# Patient Record
Sex: FEMALE | Employment: FULL TIME | URBAN - METROPOLITAN AREA
[De-identification: names, ages, dates, MRNs, and addresses within clinical notes are randomized per-mention and may not be internally consistent; named-entity substitution may affect disease eponyms.]

---

## 2017-05-10 ENCOUNTER — HOSPITAL ENCOUNTER (EMERGENCY)
Age: 27
Discharge: HOME | End: 2017-05-10
Attending: EMERGENCY MEDICINE
Payer: SELF-PAY

## 2017-05-10 VITALS
OXYGEN SATURATION: 97 % | WEIGHT: 200 LBS | BODY MASS INDEX: 34.33 KG/M2 | DIASTOLIC BLOOD PRESSURE: 94 MMHG | TEMPERATURE: 98.7 F | RESPIRATION RATE: 16 BRPM | SYSTOLIC BLOOD PRESSURE: 147 MMHG | HEART RATE: 100 BPM

## 2017-05-10 DIAGNOSIS — S29.019A ACUTE THORACIC MYOFASCIAL STRAIN, INITIAL ENCOUNTER: ICD-10-CM
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
## 2017-05-10 PROCEDURE — 99284 EMERGENCY DEPT VISIT MOD MDM: CPT

## 2017-05-10 PROCEDURE — 74011250637 HC RX REV CODE- 250/637: Performed by: EMERGENCY MEDICINE

## 2017-05-10 RX ORDER — DIAZEPAM 5 MG/1
5 TABLET ORAL
Status: COMPLETED | OUTPATIENT
Start: 2017-05-10 | End: 2017-05-10

## 2017-05-10 RX ORDER — IBUPROFEN 600 MG/1
600 TABLET ORAL
Qty: 20 TAB | Refills: 0 | Status: SHIPPED | OUTPATIENT
Start: 2017-05-10 | End: 2018-07-03

## 2017-05-10 RX ORDER — IBUPROFEN 600 MG/1
600 TABLET ORAL
Status: COMPLETED | OUTPATIENT
Start: 2017-05-10 | End: 2017-05-10

## 2017-05-10 RX ORDER — ACETAMINOPHEN 500 MG
1000 TABLET ORAL
Status: COMPLETED | OUTPATIENT
Start: 2017-05-10 | End: 2017-05-10

## 2017-05-10 RX ORDER — ACETAMINOPHEN 500 MG
1000 TABLET ORAL
Qty: 40 TAB | Refills: 0 | Status: SHIPPED | OUTPATIENT
Start: 2017-05-10 | End: 2022-02-04

## 2017-05-10 RX ORDER — CYCLOBENZAPRINE HCL 10 MG
10 TABLET ORAL
Qty: 10 TAB | Refills: 0 | Status: SHIPPED | OUTPATIENT
Start: 2017-05-10 | End: 2018-06-14

## 2017-05-10 RX ADMIN — IBUPROFEN 600 MG: 600 TABLET, FILM COATED ORAL at 22:21

## 2017-05-10 RX ADMIN — DIAZEPAM 5 MG: 5 TABLET ORAL at 22:21

## 2017-05-10 RX ADMIN — ACETAMINOPHEN 1000 MG: 500 TABLET ORAL at 22:21

## 2017-05-10 NOTE — LETTER
NOTIFICATION RETURN TO WORK / SCHOOL 
 
5/10/2017 10:07 PM 
 
Ms. [address] [facility] [address] [facility] [address] To Whom It May Concern: 
 
Whitney Ricci is currently under the care of [facility] EMERGENCY DEPT. She will return to work/school on: 5/13/17 If there are questions or concerns please have the patient contact our office. Sincerely, Delilah Rogers MD
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
## 2018-06-14 ENCOUNTER — HOSPITAL ENCOUNTER (EMERGENCY)
Age: 28
Discharge: HOME | End: 2018-06-14
Attending: EMERGENCY MEDICINE
Payer: MEDICAID

## 2018-06-14 VITALS
WEIGHT: 189 LBS | RESPIRATION RATE: 16 BRPM | HEIGHT: 64 IN | HEART RATE: 88 BPM | TEMPERATURE: 98.1 F | OXYGEN SATURATION: 100 % | SYSTOLIC BLOOD PRESSURE: 135 MMHG | DIASTOLIC BLOOD PRESSURE: 93 MMHG | BODY MASS INDEX: 32.27 KG/M2

## 2018-06-14 DIAGNOSIS — R07.89 ATYPICAL CHEST PAIN: ICD-10-CM

## 2018-06-14 DIAGNOSIS — R00.2 PALPITATIONS: Primary | ICD-10-CM

## 2018-06-14 LAB
ANION GAP SERPL CALC-SCNC: 5 MMOL/L (ref 3–18)
BASOPHILS # BLD: 0 K/UL (ref 0–0.06)
BASOPHILS NFR BLD: 0 % (ref 0–2)
BUN SERPL-MCNC: 16 MG/DL (ref 7–18)
BUN/CREAT SERPL: 18 (ref 12–20)
CALCIUM SERPL-MCNC: 9.1 MG/DL (ref 8.5–10.1)
CHLORIDE SERPL-SCNC: 107 MMOL/L (ref 100–108)
CK MB CFR SERPL CALC: NORMAL % (ref 0–4)
CK MB SERPL-MCNC: <1 NG/ML (ref 5–25)
CK SERPL-CCNC: 84 U/L (ref 26–192)
CO2 SERPL-SCNC: 29 MMOL/L (ref 21–32)
CREAT SERPL-MCNC: 0.91 MG/DL (ref 0.6–1.3)
DIFFERENTIAL METHOD BLD: ABNORMAL
EOSINOPHIL # BLD: 0 K/UL (ref 0–0.4)
EOSINOPHIL NFR BLD: 0 % (ref 0–5)
ERYTHROCYTE [DISTWIDTH] IN BLOOD BY AUTOMATED COUNT: 13.8 % (ref 11.6–14.5)
GLUCOSE SERPL-MCNC: 87 MG/DL (ref 74–99)
HCT VFR BLD AUTO: 35.8 % (ref 35–45)
HGB BLD-MCNC: 11.9 G/DL (ref 12–16)
LYMPHOCYTES # BLD: 2.8 K/UL (ref 0.9–3.6)
LYMPHOCYTES NFR BLD: 44 % (ref 21–52)
MCH RBC QN AUTO: 26.7 PG (ref 24–34)
MCHC RBC AUTO-ENTMCNC: 33.2 G/DL (ref 31–37)
MCV RBC AUTO: 80.3 FL (ref 74–97)
MONOCYTES # BLD: 0.3 K/UL (ref 0.05–1.2)
MONOCYTES NFR BLD: 4 % (ref 3–10)
NEUTS SEG # BLD: 3.2 K/UL (ref 1.8–8)
NEUTS SEG NFR BLD: 52 % (ref 40–73)
PLATELET # BLD AUTO: 162 K/UL (ref 135–420)
PMV BLD AUTO: 11.7 FL (ref 9.2–11.8)
POTASSIUM SERPL-SCNC: 4.2 MMOL/L (ref 3.5–5.5)
RBC # BLD AUTO: 4.46 M/UL (ref 4.2–5.3)
SODIUM SERPL-SCNC: 141 MMOL/L (ref 136–145)
TROPONIN I SERPL-MCNC: <0.02 NG/ML (ref 0–0.04)
WBC # BLD AUTO: 6.4 K/UL (ref 4.6–13.2)

## 2018-06-14 PROCEDURE — 82550 ASSAY OF CK (CPK): CPT | Performed by: PHYSICIAN ASSISTANT

## 2018-06-14 PROCEDURE — 99283 EMERGENCY DEPT VISIT LOW MDM: CPT

## 2018-06-14 PROCEDURE — 85025 COMPLETE CBC W/AUTO DIFF WBC: CPT | Performed by: PHYSICIAN ASSISTANT

## 2018-06-14 PROCEDURE — 93005 ELECTROCARDIOGRAM TRACING: CPT

## 2018-06-14 PROCEDURE — 80048 BASIC METABOLIC PNL TOTAL CA: CPT | Performed by: PHYSICIAN ASSISTANT
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
## 2018-06-15 LAB
ATRIAL RATE: 70 BPM
CALCULATED P AXIS, ECG09: 43 DEGREES
CALCULATED R AXIS, ECG10: 57 DEGREES
CALCULATED T AXIS, ECG11: 45 DEGREES
DIAGNOSIS, 93000: NORMAL
P-R INTERVAL, ECG05: 132 MS
Q-T INTERVAL, ECG07: 408 MS
QRS DURATION, ECG06: 94 MS
QTC CALCULATION (BEZET), ECG08: 440 MS
VENTRICULAR RATE, ECG03: 70 BPM

## 2018-07-03 ENCOUNTER — HOSPITAL ENCOUNTER (EMERGENCY)
Age: 28
Discharge: HOME | End: 2018-07-03
Attending: EMERGENCY MEDICINE
Payer: MEDICAID

## 2018-07-03 ENCOUNTER — APPOINTMENT (OUTPATIENT)
Dept: GENERAL RADIOLOGY | Age: 28
End: 2018-07-03
Attending: PHYSICIAN ASSISTANT
Payer: MEDICAID

## 2018-07-03 VITALS
SYSTOLIC BLOOD PRESSURE: 144 MMHG | RESPIRATION RATE: 15 BRPM | HEART RATE: 75 BPM | OXYGEN SATURATION: 100 % | TEMPERATURE: 98.4 F | DIASTOLIC BLOOD PRESSURE: 73 MMHG | WEIGHT: 189 LBS | BODY MASS INDEX: 32.27 KG/M2 | HEIGHT: 64 IN

## 2018-07-03 DIAGNOSIS — M54.50 ACUTE BILATERAL LOW BACK PAIN WITHOUT SCIATICA: Primary | ICD-10-CM

## 2018-07-03 PROCEDURE — 96361 HYDRATE IV INFUSION ADD-ON: CPT

## 2018-07-03 PROCEDURE — 99282 EMERGENCY DEPT VISIT SF MDM: CPT

## 2018-07-03 PROCEDURE — 96374 THER/PROPH/DIAG INJ IV PUSH: CPT

## 2018-07-03 PROCEDURE — 96375 TX/PRO/DX INJ NEW DRUG ADDON: CPT

## 2018-07-03 PROCEDURE — 72100 X-RAY EXAM L-S SPINE 2/3 VWS: CPT

## 2018-07-03 RX ORDER — CYCLOBENZAPRINE HCL 10 MG
10 TABLET ORAL
Qty: 12 TAB | Refills: 0 | Status: SHIPPED | OUTPATIENT
Start: 2018-07-03 | End: 2022-02-04

## 2018-07-03 RX ORDER — NAPROXEN 500 MG/1
500 TABLET ORAL 2 TIMES DAILY WITH MEALS
Qty: 20 TAB | Refills: 0 | Status: SHIPPED | OUTPATIENT
Start: 2018-07-03 | End: 2018-07-13

## 2018-07-03 NOTE — DISCHARGE INSTRUCTIONS

## 2018-07-03 NOTE — ED NOTES
Patient stated understanding of discharge instructions. Patient received one prescription(s) Patient told not to drive with medication. Patient was ambulatory upon discharge. Patient was in stable condition.      Patient armband removed and shredded
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
## 2020-02-27 ENCOUNTER — DOCUMENTATION ONLY (OUTPATIENT)
Dept: FAMILY MEDICINE CLINIC | Age: 30
End: 2020-02-27

## 2020-02-27 NOTE — LETTER
2/27/2020 [facility] [id number] Dear Ms. [facility] LEILA, We had an appointment reserved for you on 02/26/2020 and were concerned when you did not show or call within 24 hours to cancel the appointment. Our policy is to call patients two days prior to their appointment to remind them of the date and time. We perform these calls as a courtesy to our patients and to allow us the opportunity to rebook the time slot should the appointment not be necessary. Recognizing that everyones time is valuable and that appointment time is limited, we ask that you provide 24 hours notice if you are unable to keep your appointment. Please call us at your earliest convenience to reschedule your appointment as your provider felt it was important to see you. Thank you for your anticipated cooperation. The scheduling staff: 
 
Odilia Brody [address] [facility] 
[phone]

## 2022-02-04 ENCOUNTER — OFFICE VISIT (OUTPATIENT)
Dept: SURGERY | Age: 32
End: 2022-02-04
Payer: MEDICAID

## 2022-02-04 VITALS — WEIGHT: 238 LBS | TEMPERATURE: 97.5 F | BODY MASS INDEX: 39.65 KG/M2 | HEIGHT: 65 IN

## 2022-02-04 DIAGNOSIS — Z01.818 PREOPERATIVE TESTING: ICD-10-CM

## 2022-02-04 DIAGNOSIS — K21.9 GASTROESOPHAGEAL REFLUX DISEASE, UNSPECIFIED WHETHER ESOPHAGITIS PRESENT: ICD-10-CM

## 2022-02-04 DIAGNOSIS — E66.01 CLASS 3 SEVERE OBESITY WITHOUT SERIOUS COMORBIDITY WITH BODY MASS INDEX (BMI) OF 40.0 TO 44.9 IN ADULT, UNSPECIFIED OBESITY TYPE (HCC): ICD-10-CM

## 2022-02-04 DIAGNOSIS — E66.01 CLASS 3 SEVERE OBESITY WITHOUT SERIOUS COMORBIDITY WITH BODY MASS INDEX (BMI) OF 40.0 TO 44.9 IN ADULT, UNSPECIFIED OBESITY TYPE (HCC): Primary | ICD-10-CM

## 2022-02-04 PROCEDURE — 99205 OFFICE O/P NEW HI 60 MIN: CPT | Performed by: SURGERY

## 2022-02-04 NOTE — PROGRESS NOTES
Kurtis Navarro is a 36 y.o. female (: 1990) presenting to address:    Chief Complaint   Patient presents with    New Patient     GBP consult        Medication list and allergies have been reviewed with Kurtis Navarro and updated as of today's date. I have gone over all Medical, Surgical and Social History with Kurtis Navarro and updated/added the information accordingly.
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
## 2022-02-06 LAB
SPECIMEN STATUS REPORT, ROLRST: NORMAL
UREA BREATH TEST QL: POSITIVE

## 2022-02-07 ENCOUNTER — TELEPHONE (OUTPATIENT)
Dept: SURGERY | Age: 32
End: 2022-02-07

## 2022-02-07 RX ORDER — AMOXICILLIN 500 MG/1
1000 CAPSULE ORAL 2 TIMES DAILY
Qty: 30 CAPSULE | Refills: 0 | Status: SHIPPED | OUTPATIENT
Start: 2022-02-07 | End: 2022-02-21

## 2022-02-07 RX ORDER — CLARITHROMYCIN 500 MG/1
500 TABLET, FILM COATED ORAL 2 TIMES DAILY
Qty: 28 TABLET | Refills: 0 | Status: SHIPPED | OUTPATIENT
Start: 2022-02-07 | End: 2022-02-07

## 2022-02-07 RX ORDER — AMOXICILLIN 500 MG/1
1000 CAPSULE ORAL 2 TIMES DAILY
Qty: 30 CAPSULE | Refills: 0 | Status: SHIPPED | OUTPATIENT
Start: 2022-02-07 | End: 2022-02-07

## 2022-02-07 RX ORDER — CLARITHROMYCIN 500 MG/1
500 TABLET, FILM COATED ORAL 2 TIMES DAILY
Qty: 28 TABLET | Refills: 0 | Status: SHIPPED | OUTPATIENT
Start: 2022-02-07 | End: 2022-02-21

## 2022-02-07 RX ORDER — ESOMEPRAZOLE MAGNESIUM 40 MG/1
40 FOR SUSPENSION ORAL DAILY
Qty: 14 PACKET | Refills: 0 | Status: SHIPPED | OUTPATIENT
Start: 2022-02-07 | End: 2022-02-21

## 2022-02-07 RX ORDER — ESOMEPRAZOLE MAGNESIUM 40 MG/1
40 FOR SUSPENSION ORAL DAILY
Qty: 14 PACKET | Refills: 0 | Status: SHIPPED | OUTPATIENT
Start: 2022-02-07 | End: 2022-02-07

## 2022-02-07 NOTE — TELEPHONE ENCOUNTER
Informed patient of positive H. Pylori results and reviewed treatment plan. Patient verbalized understanding.     ----- Message from Claudio Moyer MD sent at 2/7/2022  9:56 AM EST -----  Please notify patient of H pylori positive result and that I sent scripts to her pharmacy on file (mail in pharmacy, looks like).  Thanks!  ----- Message -----  From: Manuela Taylor Lab Results In  Sent: 2/6/2022   6:38 AM EST  To: Claudio Moyer MD

## 2022-02-16 ENCOUNTER — HOSPITAL ENCOUNTER (OUTPATIENT)
Dept: BARIATRICS/WEIGHT MGMT | Age: 32
Discharge: HOME | End: 2022-02-16

## 2022-02-18 ENCOUNTER — DOCUMENTATION ONLY (OUTPATIENT)
Dept: BARIATRICS/WEIGHT MGMT | Age: 32
End: 2022-02-18

## 2022-02-18 NOTE — PROGRESS NOTES
[address] Loss Dena BOWERS [place] [address] [facility], [address]    Patient's Name: Jose Rooney   Age: 36 y.o. YOB: 1990   Sex: female    Date:   2/18/2022              Session: 1 of 6  Revision:     Surgeon:  Suzanne Ibarra    Height: 64.5  inches   Weight:    237      Lbs. BMI: 40.1  Pounds Lost since last month: 1               Pounds Gained since last month: 0    Starting Weight: 238     Previous Months Weight: 238  Overall Pounds Lost: 1   Overall Pounds Gained: 0    Do you smoke? no    Alcohol intake:  Number of drinks at a time:  Occasionally 3 times a month      Class Guidelines    Guidelines are reviewed with patient at the start of every class. 1. Patient understands that weight loss trial classes must be consecutive. Patient understands if they miss a class, it is their responsibility to contact me to reschedule class. I will reach out to patient after their first no show. 2.  Patient understands the expectations that weight maintenance/weight loss is expected during the classes. Failure to demonstrate changes may result in one extra month of weight loss trial, followed by going back to see the surgeon. 3. Patient is also instructed to be doing their labs, blood work, psych visit, support group and any other test that the surgeon has used while they are working on their weight loss trial.   Patient understands that they CAN NOT gain any weight during the weight loss trial.  Gaining weight will result in extra classes    Changes Made Since Last Class: Drinking only water    Eating Habits and Behaviors      Today we started off class talking about the Key Diet Principles. Patient was encouraged to start drinking 64 ounces of fluid per day. Patient was encouraged to start cutting out soda, caffeine, carbonation, sweet tea, fruit juice, and fruit smoothies.  Patient was also instructed to fill up on meat, fish, vegetables, eggs, cheese, and some fruit. We also talked about protein drinks and patient was encouraged to start trying these, using them either for a meal replacement or a substitute for a current meal, which may be higher in carbohydrates. We talked about ways to lower carbohydrates and start trying substitutes, such as zucchini noodles and cauliflower rice. Patient's current diet habits include: Patient is eating 2 meals per day. Patient states meals are normally made up of salad, meat. We have talked about eating protein first, following by vegetables. Patient is encouraged to start eliminating carbohydrates and try to keep less than 75 grams per day. Examples were reviewed. Portion sizes are 3 oz. Suggestions and tips were reviewed to help decrease portion sizes. Eating out frequency is 5 times a month. We talked about the importance of planning ahead, so eating out intake can be decreased. Discussed with patient if they need to eat out, healthier options to choose from. Carbohydrate intake (including bread, rice, pasta, cereal, crackers, chips, etc.) is: pasta, bread, rice. I have talked to patient about the importance of filling up on protein first, which will help with satiety. Patient is snacking  on chips. We talked about snacks that would be more protein-based. Fluid intake is:  Smoothies water and sodas. Patient is encouraged to stick to non-caloric drinks only. Physical Activity/Exercise    Comments:     Currently for exercise, patient is gym twice a week. We talked about activities for patient to do, including walking, swimming, or chair exercises. I also talked with patient about doing some strength training, which helps the metabolism, as well. Goals have been set. Behavior Modification       Comments:   I also gave a power point on Behavior Changes and Weight Loss. Some of the suggestions in the power point included food journaling.   Patient was also given some strategies to follow, such as cooking just enough for the meal and not putting serving bowls on the table. Patient was also encouraged to restrict where they are eating to 1-2 locations to avoid mindless eating throughout the day. Patient was given a check off list and encouraged to set 3 goals for the month. I have really stressed to patient the importance of food journaling. We talked about the accountability that this provides. Patient is currently not doing food journaling.       One goal that patient has set for next month is:  Exercise, discipline, changing eating habits    Mehnaz Us RDN  2/18/2022

## 2022-03-17 ENCOUNTER — TELEPHONE (OUTPATIENT)
Dept: SURGERY | Age: 32
End: 2022-03-17

## 2022-03-17 NOTE — TELEPHONE ENCOUNTER
Patient requesting to speak with Bradley Ramsey RD. Patient states she just missed the call. Patient states she think she missed her zoom meeting yesterday. Patient states she maryse receive a link but it was for 4/17/22 registration and not for meeting yesterday.   Patient states please call [phone]

## 2022-03-23 ENCOUNTER — HOSPITAL ENCOUNTER (OUTPATIENT)
Dept: BARIATRICS/WEIGHT MGMT | Age: 32
Discharge: HOME | End: 2022-03-23

## 2022-03-28 ENCOUNTER — DOCUMENTATION ONLY (OUTPATIENT)
Dept: BARIATRICS/WEIGHT MGMT | Age: 32
End: 2022-03-28

## 2022-04-20 ENCOUNTER — HOSPITAL ENCOUNTER (OUTPATIENT)
Dept: BARIATRICS/WEIGHT MGMT | Age: 32
Discharge: HOME | End: 2022-04-20

## 2022-04-21 ENCOUNTER — DOCUMENTATION ONLY (OUTPATIENT)
Dept: BARIATRICS/WEIGHT MGMT | Age: 32
End: 2022-04-21

## 2022-04-21 NOTE — PROGRESS NOTES
[address] Loss Dena ELISSA [place] [address] [facility], [address]    Patient's Name: Vanita Bender   Age: 36 y.o. YOB: 1990   Sex: female    Date:   4/20/2022    Insurance: [employer]            Session: 3 of  6  Surgeon:  Mable Bosworth    Height: 65 inches   Weight:    220      Lbs. BMI: 36.6   Pounds Lost since last month: 10                 Pounds Gained since last month: 0    Starting Weight: 238     Previous Months Weight: 230  Overall Pounds Lost: 18   Overall Pounds Gained: 0    Patient has  been to a support group meeting. Do you smoke? no    Alcohol intake:  Number of drinks at a time:  5  Number of times a week: 0ccassionally    Class Guidelines    Guidelines are reviewed with patient at the start of every class. 1. Patient understands that weight loss trial classes must be consecutive. Patient understands if they miss a class, it is their responsibility to contact me to reschedule class. I will reach out to patient after their first no show. 2.  Patient understands the expectations that weight maintenance/weight loss is expected during the classes. Failure to demonstrate changes may result in one extra month of weight loss trial, followed by going back to see the surgeon. Patient understands that they CAN NOT gain any weight during the weight loss trial.  Gaining weight will result in extra classes. 3. Patient is also instructed to be doing their labs, blood work, psych visit, support group and any other test that the surgeon has used while they are working on their weight loss trial.  4.  Patient was instructed to bring their blue binder to every class and appointment. Changes Made Since Last Class: work out 30 minutes every day    Eating Habits and Behaviors      We started off class today by reviewing key diet principles.   Patient was given a very specific list of foods that they can eat, which included meat, fish, vegetables, eggs, cheese, fats, soy, and berries. Patient was also given a list of foods that should be avoided. These included sweets (candy, soda, baked goods, ice cream), and starches, including pasta, rice, crackers, chips, oatmeal, bread. We talked about appropriate protein-based snacks, including deli meat, low fat cheese, yogurt, hummus, small handful of almonds. We talked about working on sipping fluid throughout the day and working towards 64 ounces. I have recommended water, Crystal light, sugar free drinks, but eliminating soda, sweet tea, and fruit juices. I also gave a power point on ways to help with the metabolism. Some ways that can help boost one's metabolism include healthy snacking. Eating 6 small meals in the pre op phase can help keep the metabolism revved up. It is recommended to focus on protein-based snacks. Exercise is another way to increase resting metabolic rate. The more lean muscle one has, the more calories they will burn at rest.  Dehydration can slow down one's metabolism, as well. Patient is encouraged to keep sipping to work towards 64 ounces of fluid per day. Protein is another booster for metabolism. Foods high in carbohydrates and fat slow down the metabolism. Patient's current diet habits include: Patient is eating 2 meals a day. Patient is eating oatmeal, chicken greensat meals. I have encouraged patient to use a smaller plate and fill up on water before meals to help cut down on portions. Patient's is eating bread, rice, pasta, cereal, and other carbohydrate. Patient is snacking on chips, cake  I have talked about the importance of getting into the habit now of cutting the sweets out. Fluid intake:  8 cups of water, 3 cups gatorade, 1 cup soda, 2 cups smoothies. Patient is encouraged not to drink calories and stick to non-carbonated, non-caffeine, sugar free drinks. The goal is 64 ounces of fluid per day.       Physical Activity/Exercise    Comments: [facility] We talked about activities for patient to do, including walking, swimming, or chair exercises. I also talked with patient about doing some strength training, which helps the metabolism, as well. Patient understands the importance of establishing an activity routine and that surgery is only a small piece of puzzle, but exercise and diet changes will play a role in long term success. Behavior Modification       Comments: In the power point, Mastering Your Metabolism, I also gave behaviors that can help with one's metabolism. These include not skipping meals and being sure to feed and fuel that body rather than going large gaps and putting the body in starvation mode. Patient is encouraged to eat within 1 hour of waking up and have a cut off time in the evening. We talked about night time syndrome where a person may skip breakfast and lunch and then consume the majority of their calories from dinner until bed time. I  have talked about how important it it to get 3 meals in per day, eat breakfast, and BREAK THE FAST. One goal for next month includes:  1.   Stop snacking      Lionel Rodas RDN  4/21/2022

## 2022-05-23 ENCOUNTER — DOCUMENTATION ONLY (OUTPATIENT)
Dept: BARIATRICS/WEIGHT MGMT | Age: 32
End: 2022-05-23

## 2022-05-23 ENCOUNTER — HOSPITAL ENCOUNTER (OUTPATIENT)
Dept: BARIATRICS/WEIGHT MGMT | Age: 32
Discharge: HOME | End: 2022-05-23

## 2022-05-25 ENCOUNTER — DOCUMENTATION ONLY (OUTPATIENT)
Dept: BARIATRICS/WEIGHT MGMT | Age: 32
End: 2022-05-25

## 2022-05-25 NOTE — PROGRESS NOTES
[address] [place] Loss [place]ena ELISSA [place] [address] [facility], [address]    Patient's Name: Gianfranco Nelson   Age: 36 y.o. YOB: 1990   Sex: female    Date:   5/23/2022    Insurance:  [employer]            Session: 4 of 6  Surgeon:  Jovanna Cervantes    Height: 65 inches   Weight:    235      Lbs. BMI: 39.1  Pounds Lost since last month: 0                 Pounds Gained since last month: 5    Starting Weight: 238     Previous Months Weight: 230  Overall Pounds Lost: 3   Overall Pounds Gained: 0    Patient has not been to a support group meeting. Do you smoke? no    Alcohol intake:  Number of drinks at a time:  none      Class Guidelines    Guidelines are reviewed with patient at the start of every class. 1. Patient understands that weight loss trial classes must be consecutive. Patient understands if they miss a class, it is their responsibility to contact me to reschedule class. I will reach out to patient after their first no show. 2.  Patient understands the expectations that weight maintenance/weight loss is expected during the classes. Failure to demonstrate changes may result in one extra month of weight loss trial, followed by going back to see the surgeon. Patient understands that they CAN NOT gain any weight during the weight loss trial.  Gaining weight will result in extra classes. 3. Patient is also instructed to be doing their labs, blood work, psych visit, support group and any other test that the surgeon has used while they are working on their weight loss trial.  4.  Patient was instructed to bring their blue binder to every class and appoin    Changes Made Since Last Class: no fast food     Eating Habits and Behaviors      We started off class today by reviewing key diet principles. Patient was given a very specific list of foods that they can eat, which included meat, fish, vegetables, eggs, cheese, fats, soy, and berries.   Patient was also given a list of foods that should be avoided. These included sweets (candy, soda, baked goods, ice cream), and starches, including pasta, rice, crackers, chips, oatmeal, bread. We talked about appropriate protein-based snacks, including deli meat, low fat cheese, yogurt, hummus, small handful of almonds. We talked about working on sipping fluid throughout the day and working towards 64 ounces. I have recommended water, Crystal light, sugar free drinks, but eliminating soda, sweet tea, and fruit juices. I also gave a power point on ways to help with the metabolism. Some ways that can help boost one's metabolism include healthy snacking. Eating 6 small meals in the pre op phase can help keep the metabolism revved up. It is recommended to focus on protein-based snacks. Exercise is another way to increase resting metabolic rate. The more lean muscle one has, the more calories they will burn at rest.  Dehydration can slow down one's metabolism, as well. Patient is encouraged to keep sipping to work towards 64 ounces of fluid per day. Protein is another booster for metabolism. Foods high in carbohydrates and fat slow down the metabolism. Patient's current diet habits include: Patient is eating 2 meals a day. Patient states their portions are regular plate I have encouraged patient to use a smaller plate and fill up on water before meals to help cut down on portions. Patient is eating out: 1 time a week Patient's is eating bread, rice, pasta, cereal, and other carbohydrates. Patient is snacking on candy and chips  I have talked about the importance of getting into the habit now of cutting the sweets out. Fluid intake:  Not given   Patient is encouraged not to drink calories and stick to non-carbonated, non-caffeine, sugar free drinks. The goal is 64 ounces of fluid per day. Physical Activity/Exercise    Comments:     Currently for exercise, patient is working out 2-3 days per week.   We talked about activities for patient to do, including walking, swimming, or chair exercises. I also talked with patient about doing some strength training, which helps the metabolism, as well. Patient understands the importance of establishing an activity routine and that surgery is only a small piece of puzzle, but exercise and diet changes will play a role in long term success. Behavior Modification       Comments: In the power point, Portion Control: The importance of measuring out portions and reading food labels. This  include not skipping meals and being sure to feed and fuel that body rather than going large gaps and putting the body in starvation mode. Patient is encouraged to eat within 1 hour of waking up and have a cut off time in the evening. We talked about night time syndrome where a person may skip breakfast and lunch and then consume the majority of their calories from dinner until bed time. I  have talked about how important it it to get 3 meals in per day, eat breakfast, and BREAK THE FAST. One goal for next month includes:  1.   Stop snacking      Susan Pop  5/25/2022

## 2022-06-22 ENCOUNTER — HOSPITAL ENCOUNTER (OUTPATIENT)
Dept: BARIATRICS/WEIGHT MGMT | Age: 32
Discharge: HOME | End: 2022-06-22

## 2022-06-28 ENCOUNTER — DOCUMENTATION ONLY (OUTPATIENT)
Dept: BARIATRICS/WEIGHT MGMT | Age: 32
End: 2022-06-28

## 2022-06-28 NOTE — PROGRESS NOTES
[address] [place] [facility] [place] BOWERS [place] [address] [facility], [address]    Patient's Name: Kaur Lainez   Age: 36 y.o. YOB: 1990   Sex: female  Date:   6/22/2022    Insurance:  [employer] [employer]       Session: 5 of 6  Revision:  Surgeon:  Arnulfo Hernandez    Height: 64 inches Weight:    235      Lbs. BMI: 40.4   Pounds Lost since last month: 0               Pounds Gained since last month: 0    Starting Weight: 238   Previous Months Weight: 235  Overall Pounds Lost: 3 Overall Pounds Gained: 0      Do you smoke? no    Alcohol intake:  Number of drinks at a time:  none      Class Guidelines    Guidelines are reviewed with patient at the start of every class. 1. Patient understands that weight loss trial classes must be consecutive. Patient understands if they miss a class, it is their responsibility to contact me to reschedule class. I will reach out to patient after their first no show. 2.  Patient understands the expectations that weight maintenance/weight loss is expected during the classes. Failure to demonstrate changes may result in one extra month of weight loss trial, followed by going back to see the surgeon. Patient understands that they CAN NOT gain any weight during the weight loss trial.  Gaining weight will result in extra classes. 3. Patient is also instructed to be doing their labs, blood work, psych visit, support group and any other test that the surgeon has used while they are working on their weight loss trial.  4.  Patient was instructed to bring their blue binder to every class and appointment. Changes Made Since Last Class: more working out    Eating Habits and Behaviors    Today in class, I reviewed a power point that discussed Nutrition, Behavior, and Exercise changes to start working on.   Some of the eating behaviors that we discussed included the importance of eating breakfast.  We talked about some of the reasons that people don't eat breakfast and food choices that would be appropriate. We also talked about avoiding liquid calories. Patient is encouraged to aim for 64 ounces of fluid per day and trying to get them from water, Crystal Light, sugar free drinks. We also talked about eating out options that are healthier. Patient was encouraged to always request sauces and dressings on the side and request a salad, broth-based soup, or vegetables in place of fries. Patient's current diet habits include: Patient's current diet habits include: Patient is eating 2 meals a day. Patient states their portion sizes are unknown. I have encouraged patient to use a smaller plate and fill up on water before meals to help cut down on portions. Patient's is eating bread, rice, pasta, cereal, and other carbohydrates. I have talked about the importance of getting into the habit now of cutting the sweets out. Fluid intake:  8 cups of water. Patient is encouraged not to drink calories and stick to non-carbonated, non-caffeine, sugar free drinks. The goal is 64 ounces of fluid per day. Physical Activity/Exercise    Comments: We talked about exercise. Patient was given reasons of why exercise is so important and how that can help with their long-term success. I have encouraged patient to get a support system to help with the activity. Currently for activity, patient is doing walking. We have talked about goals for activity to incorporated. Behavior Modification       Comments: We also talked about behavior modifications. We talked about eating triggers, such as eating in front of the TV and solutions, such as making the TV a no eating zone. If patient is eating out out of emotion, food will only temporarily solve that. Patient is encouraged to HALT and assess if they are eating because they are Hungry, or out of emotions: Anxious, Lonely, Tired, which the food will only temporarily solve.   We also talked about ways to prevent relapse. Goals that patient wants to work on includes:  1. Get ready for surgery  2.  Drink more water      Alexandr Rdz RDN  6/28/2022

## 2022-07-21 ENCOUNTER — HOSPITAL ENCOUNTER (OUTPATIENT)
Dept: BARIATRICS/WEIGHT MGMT | Age: 32
Discharge: HOME | End: 2022-07-21

## 2022-07-24 ENCOUNTER — DOCUMENTATION ONLY (OUTPATIENT)
Dept: BARIATRICS/WEIGHT MGMT | Age: 32
End: 2022-07-24

## 2022-07-24 NOTE — PROGRESS NOTES
[address] Loss [place] ELISSA [facility] [address], [address]    Patient's Name: Song Cavazos   Age: 29 y.o. YOB: 1990   Sex: female    Date:   7/24/2022    Insurance:  [employer]            Session: 6/6  Revision:     Surgeon:  Deshaun Diallo    Height: 64 inches   Weight:    239      Lbs. BMI: 41.1   Pounds Lost since last month: 0                 Pounds Gained since last month: 1    Starting Weight: 238     Previous Months Weight: 238  Overall Pounds Lost: 0   Overall Pounds Gained: 1      Do you smoke? no    Alcohol intake:  Number of drinks at a time:  stopped drinking      Class Guidelines    Guidelines are reviewed with patient at the start of every class. 1. Patient understands that weight loss trial classes must be consecutive. Patient understands if they miss a class, it is their responsibility to contact me to reschedule class. I will reach out to patient after their first no show. 2.  Patient understands the expectations that weight maintenance/weight loss is expected during the classes. Failure to demonstrate changes may result in one extra month of weight loss trial, followed by going back to see the surgeon. Patient understands that they CAN NOT gain any weight during the weight loss trial.  Gaining weight will result in extra classes. 3. Patient is also instructed to be doing their labs, blood work, psych visit, support group and any other test that the surgeon has used while they are working on their weight loss trial.  4.  Patient was instructed to bring their blue binder to every class and appointment. Changes Made Since Last Class: stopped drinking    Eating Habits and Behaviors    Today in class, we started talking about the key diet principles. We first focused on stopping liquid calories. Patient was also educated on carbohydrates.   Patient was instructed to start cutting out bread, rice, and pasta from the diet and start focusing more on meat and vegetables. I then gave a power point, which focused on Label Reading. In class, I gave patients a labels and we worked through a series of questions to help patients have a better understanding of label reading. Patient was instructed to review the serving size. Patient was encouraged to focus on protein and carbohydrates. We also did a few label reading activities to help the patient become more familiar with label reading. Patient's current diet habits include: Patient's current diet habits include: Patient is eating 3 meals a day. Patient states their portion sizes are smaller. I have encouraged patient to use a smaller plate and fill up on water before meals to help cut down on portions. Patient's is eating bread, rice, pasta, cereal, and other carbohydrates. I have talked about the importance of getting into the habit now of cutting the sweets out. Fluid intake:gallon of water, crystal light, unsweetened tea. One soda a day. Patient is encouraged not to drink calories and stick to non-carbonated, non-caffeine, sugar free drinks. The goal is 64 ounces of fluid per day. Physical Activity/Exercise    Comments: We talked about exercise. Patient was given reasons of why exercise is so important and how that can help with their long-term success. I have encouraged patient to get a support system to help with the activity. Currently for activity, patient is doing start working out. Behavior Modification       Comments:  Behavior modifications were reinforced. This included not eating in front of the TV, which could lead to bigger portions and eating when one is not hungry. We also talked about the importance of eating 3 meals per day.   Patient was encouraged to food journal to keep their daily carbohydrates less than 30 grams per meal.      Goals that patient wants to work on includes:  1.workout more      Norma More RDN  7/24/2022

## 2022-08-12 ENCOUNTER — DOCUMENTATION ONLY (OUTPATIENT)
Dept: BARIATRICS/WEIGHT MGMT | Age: 32
End: 2022-08-12

## 2022-08-23 ENCOUNTER — TELEPHONE (OUTPATIENT)
Dept: SURGERY | Age: 32
End: 2022-08-23

## 2022-08-23 NOTE — TELEPHONE ENCOUNTER
I got an e-mail from Dr. Dago Aarujo, and this is regarding the patient stating that she had put bariatric surgery on hold. Please read the following e-mail by Dr. Dago Araujo: \"Helsarika     Nirali Jeffers was scheduled to see me for evaluation this morning. She indicated she has put surgery process on hold at the moment due to a lapse in her insurance coverage and sounds like she's hoping to get back into the program eventually. She said she had notified you guys, but wanted to let you know in case you weren't aware. Thanks! \"    I had 2 conversations with Ms. David Xavier, on 8/1/2022 and 8/11/2022, she didn't mentioned regarding lapse in insurance coverage. This is news to me!     Vu Tubbs

## 2022-09-14 ENCOUNTER — DOCUMENTATION ONLY (OUTPATIENT)
Dept: BARIATRICS/WEIGHT MGMT | Age: 32
End: 2022-09-14

## 2022-09-14 NOTE — PROGRESS NOTES
Patient finished [facility] classes July was 1 lb over starting weight, plan in place need update from her Norma More RDN

## 2022-10-07 ENCOUNTER — HOSPITAL ENCOUNTER (OUTPATIENT)
Dept: LAB | Age: 32
Discharge: HOME | End: 2022-10-07

## 2022-10-07 ENCOUNTER — HOSPITAL ENCOUNTER (OUTPATIENT)
Dept: LAB | Age: 32
Discharge: HOME | End: 2022-10-07
Payer: MEDICAID

## 2022-10-07 DIAGNOSIS — Z01.818 PREOPERATIVE TESTING: ICD-10-CM

## 2022-10-07 DIAGNOSIS — E66.01 CLASS 3 SEVERE OBESITY WITHOUT SERIOUS COMORBIDITY WITH BODY MASS INDEX (BMI) OF 40.0 TO 44.9 IN ADULT, UNSPECIFIED OBESITY TYPE (HCC): ICD-10-CM

## 2022-10-07 LAB
ATRIAL RATE: 64 BPM
CALCULATED P AXIS, ECG09: 38 DEGREES
CALCULATED R AXIS, ECG10: 56 DEGREES
CALCULATED T AXIS, ECG11: 42 DEGREES
DIAGNOSIS, 93000: NORMAL
P-R INTERVAL, ECG05: 132 MS
Q-T INTERVAL, ECG07: 430 MS
QRS DURATION, ECG06: 94 MS
QTC CALCULATION (BEZET), ECG08: 443 MS
SENTARA SPECIMEN COL,SENBCF: NORMAL
VENTRICULAR RATE, ECG03: 64 BPM

## 2022-10-07 PROCEDURE — 99001 SPECIMEN HANDLING PT-LAB: CPT

## 2022-10-07 PROCEDURE — 93005 ELECTROCARDIOGRAM TRACING: CPT

## 2022-10-08 LAB
25(OH)D3 SERPL-MCNC: 13.1 NG/ML (ref 32–100)
ABSOLUTE LYMPHOCYTE COUNT, 10803: 2.2 K/UL (ref 1–4.8)
AVG GLU, 10930: 120 MG/DL (ref 91–123)
BASOPHILS # BLD: 0 K/UL (ref 0–0.2)
BASOPHILS NFR BLD: 1 % (ref 0–2)
EOSINOPHIL # BLD: 0 K/UL (ref 0–0.5)
EOSINOPHIL NFR BLD: 1 % (ref 0–6)
ERYTHROCYTE [DISTWIDTH] IN BLOOD BY AUTOMATED COUNT: 16.2 % (ref 10–15.5)
GRANULOCYTES,GRANS: 60 % (ref 40–75)
HBA1C MFR BLD HPLC: 5.8 % (ref 4.8–5.6)
HCT VFR BLD AUTO: 36 % (ref 35.1–46.5)
HGB BLD-MCNC: 10.9 G/DL (ref 11.7–15.5)
LYMPHOCYTES, LYMLT: 34 % (ref 20–45)
MCH RBC QN AUTO: 25 PG (ref 26–34)
MCHC RBC AUTO-ENTMCNC: 30 G/DL (ref 31–36)
MCV RBC AUTO: 83 FL (ref 80–99)
MONOCYTES # BLD: 0.4 K/UL (ref 0.1–1)
MONOCYTES NFR BLD: 5 % (ref 3–12)
NEUTROPHILS # BLD AUTO: 3.9 K/UL (ref 1.8–7.7)
PLATELET # BLD AUTO: 251 K/UL (ref 140–440)
PMV BLD AUTO: 12.3 FL (ref 9–13)
RBC # BLD AUTO: 4.33 M/UL (ref 3.8–5.2)
WBC # BLD AUTO: 6.5 K/UL (ref 4–11)

## 2022-10-10 LAB
COTININE SCREEN, U, 716557: POSITIVE NG/ML
DRUG SCREEN COMMENT, 733825: ABNORMAL

## 2022-10-10 RX ORDER — ASPIRIN 325 MG
50000 TABLET, DELAYED RELEASE (ENTERIC COATED) ORAL
Qty: 12 CAPSULE | Refills: 0 | Status: SHIPPED | OUTPATIENT
Start: 2022-10-10

## 2022-10-11 ENCOUNTER — TELEPHONE (OUTPATIENT)
Dept: SURGERY | Age: 32
End: 2022-10-11

## 2022-10-11 NOTE — TELEPHONE ENCOUNTER
Contacted patient and verified identity using name and date of birth (2- identifiers). Patient notified of vitamin D deficiency and informed can  vit D at her pharmacy. Patient verbalized understanding and will  medication today.

## 2022-10-11 NOTE — TELEPHONE ENCOUNTER
----- Message from Amari Alex MD sent at 10/10/2022  9:23 AM EDT -----  Please notify patient of vitamin D deficiency and that I sent a script to her pharmacy on file.  Thanks!  ----- Message -----  From: [facility]  Sent: 10/9/2022   9:48 PM EDT  To: Amari Alex MD

## 2022-10-13 ENCOUNTER — HOSPITAL ENCOUNTER (OUTPATIENT)
Dept: GENERAL RADIOLOGY | Age: 32
Discharge: HOME | End: 2022-10-13
Attending: SURGERY
Payer: MEDICAID

## 2022-10-13 DIAGNOSIS — Z01.818 PRE-OP TESTING: Primary | ICD-10-CM

## 2022-10-13 DIAGNOSIS — Z01.818 PRE-OP TESTING: ICD-10-CM

## 2022-10-13 DIAGNOSIS — K21.9 GASTROESOPHAGEAL REFLUX DISEASE, UNSPECIFIED WHETHER ESOPHAGITIS PRESENT: ICD-10-CM

## 2022-10-13 PROCEDURE — 74246 X-RAY XM UPR GI TRC 2CNTRST: CPT

## 2022-10-13 PROCEDURE — 71046 X-RAY EXAM CHEST 2 VIEWS: CPT

## 2022-10-13 PROCEDURE — 74011000250 HC RX REV CODE- 250: Performed by: SURGERY

## 2022-10-13 RX ADMIN — BARIUM SULFATE 135 ML: 980 POWDER, FOR SUSPENSION ORAL at 10:22

## 2022-10-13 RX ADMIN — BARIUM SULFATE 176 G: 960 POWDER, FOR SUSPENSION ORAL at 10:22

## 2022-10-13 RX ADMIN — ANTACID/ANTIFLATULENT 4 G: 380; 550; 10; 10 GRANULE, EFFERVESCENT ORAL at 10:23

## 2022-10-13 RX ADMIN — BARIUM SULFATE 700 MG: 700 TABLET ORAL at 10:20

## 2022-10-21 DIAGNOSIS — Z01.818 PREOPERATIVE TESTING: ICD-10-CM

## 2022-10-28 DIAGNOSIS — Z01.818 PREOPERATIVE TESTING: ICD-10-CM

## 2023-03-09 ENCOUNTER — TELEPHONE (OUTPATIENT)
Age: 33
End: 2023-03-09

## 2023-07-28 ENCOUNTER — TELEPHONE (OUTPATIENT)
Age: 33
End: 2023-07-28

## 2023-07-28 DIAGNOSIS — Z01.818 PRE-OP TESTING: ICD-10-CM

## 2023-07-28 DIAGNOSIS — E66.01 MORBID (SEVERE) OBESITY DUE TO EXCESS CALORIES (HCC): Primary | ICD-10-CM

## 2023-07-28 NOTE — TELEPHONE ENCOUNTER
Patient called [place] office to schedule follow up appointment, due to patient's history of nicotine use, patient needs nicotine lab order before being seen again by provider per Britney Bashir. Could you please order that ? Thank you!